# Patient Record
Sex: FEMALE | Race: BLACK OR AFRICAN AMERICAN | URBAN - METROPOLITAN AREA
[De-identification: names, ages, dates, MRNs, and addresses within clinical notes are randomized per-mention and may not be internally consistent; named-entity substitution may affect disease eponyms.]

---

## 2019-09-06 ENCOUNTER — OFFICE VISIT (OUTPATIENT)
Dept: URBAN - METROPOLITAN AREA CLINIC 17 | Facility: CLINIC | Age: 69
End: 2019-09-06
Payer: MEDICARE

## 2019-09-06 DIAGNOSIS — H35.371 PUCKERING OF MACULA, RIGHT EYE: ICD-10-CM

## 2019-09-06 DIAGNOSIS — H18.413 ARCUS SENILIS, BILATERAL: ICD-10-CM

## 2019-09-06 DIAGNOSIS — H11.153 PINGUECULA, BILATERAL: ICD-10-CM

## 2019-09-06 PROCEDURE — 92014 COMPRE OPH EXAM EST PT 1/>: CPT | Performed by: OPTOMETRIST

## 2019-09-06 PROCEDURE — 92133 CPTRZD OPH DX IMG PST SGM ON: CPT | Performed by: OPTOMETRIST

## 2019-09-06 ASSESSMENT — INTRAOCULAR PRESSURE
OS: 15
OD: 14

## 2019-09-06 NOTE — IMPRESSION/PLAN
Impression: Open angle with borderline findings, low risk, bilateral: H40.013. Plan: Discussed diagnosis in detail with patient. Obtained and reviewed OCT. No treatment is required at this time. Will continue to monitor IOP.  Repeat OCT in 2 years

## 2019-09-06 NOTE — IMPRESSION/PLAN
Impression: Other secondary cataract, bilateral: H26.493. Plan: Discussed diagnosis in detail with patient. No treatment is required at this time. Will continue to observe condition and or symptoms.  Refract PRN

## 2019-09-09 ENCOUNTER — OFFICE VISIT (OUTPATIENT)
Dept: URBAN - METROPOLITAN AREA CLINIC 17 | Facility: CLINIC | Age: 69
End: 2019-09-09
Payer: COMMERCIAL

## 2019-09-09 DIAGNOSIS — H52.4 PRESBYOPIA: Primary | ICD-10-CM

## 2019-09-09 PROCEDURE — 92012 INTRM OPH EXAM EST PATIENT: CPT | Performed by: OPTOMETRIST

## 2019-09-09 ASSESSMENT — KERATOMETRY
OD: 44.38
OS: 44.63

## 2019-09-09 ASSESSMENT — VISUAL ACUITY
OS: 20/25
OD: 20/25

## 2022-03-02 ENCOUNTER — OFFICE VISIT (OUTPATIENT)
Dept: URBAN - METROPOLITAN AREA CLINIC 17 | Facility: CLINIC | Age: 72
End: 2022-03-02
Payer: MEDICARE

## 2022-03-02 DIAGNOSIS — H26.493 OTHER SECONDARY CATARACT, BILATERAL: ICD-10-CM

## 2022-03-02 DIAGNOSIS — H43.813 VITREOUS DEGENERATION, BILATERAL: ICD-10-CM

## 2022-03-02 DIAGNOSIS — H35.373 PUCKERING OF MACULA, BILATERAL: ICD-10-CM

## 2022-03-02 DIAGNOSIS — E11.9 TYPE 2 DIABETES MELLITUS W/O COMPLICATION: Primary | ICD-10-CM

## 2022-03-02 DIAGNOSIS — H04.123 DRY EYE SYNDROME OF BILATERAL LACRIMAL GLANDS: ICD-10-CM

## 2022-03-02 DIAGNOSIS — H40.013 OPEN ANGLE WITH BORDERLINE FINDINGS, LOW RISK, BILATERAL: ICD-10-CM

## 2022-03-02 PROCEDURE — 92133 CPTRZD OPH DX IMG PST SGM ON: CPT | Performed by: OPTOMETRIST

## 2022-03-02 PROCEDURE — 92014 COMPRE OPH EXAM EST PT 1/>: CPT | Performed by: OPTOMETRIST

## 2022-03-02 ASSESSMENT — INTRAOCULAR PRESSURE
OS: 12
OD: 13

## 2022-03-02 NOTE — IMPRESSION/PLAN
Impression: Open angle with borderline findings, low risk, bilateral: H40.013. Plan: Discussed diagnosis in detail with patient. No treatment is required at this time. Will continue to observe condition and or symptoms. Patient instructed to call if condition gets worse.

## 2022-03-02 NOTE — IMPRESSION/PLAN
Impression: Type 2 diabetes mellitus w/o complication: D21.1. Plan: No diabetic retinopathy today. Discussed importance of closely monitoring and controlling glucose to minimize risks of progression of disease. Patient instructed to notify clinic immediately if changes to vision are noted.

## 2022-03-07 ENCOUNTER — OFFICE VISIT (OUTPATIENT)
Dept: URBAN - METROPOLITAN AREA CLINIC 17 | Facility: CLINIC | Age: 72
End: 2022-03-07
Payer: COMMERCIAL

## 2022-03-07 PROCEDURE — 92012 INTRM OPH EXAM EST PATIENT: CPT | Performed by: OPTOMETRIST

## 2022-03-07 ASSESSMENT — INTRAOCULAR PRESSURE
OD: 16
OS: 18

## 2022-03-07 ASSESSMENT — VISUAL ACUITY
OD: 20/20
OS: 20/20

## 2023-08-29 ENCOUNTER — OFFICE VISIT (OUTPATIENT)
Dept: URBAN - METROPOLITAN AREA CLINIC 17 | Facility: CLINIC | Age: 73
End: 2023-08-29
Payer: MEDICARE

## 2023-08-29 DIAGNOSIS — H43.813 VITREOUS DEGENERATION, BILATERAL: ICD-10-CM

## 2023-08-29 DIAGNOSIS — H35.371 PUCKERING OF MACULA, RIGHT EYE: ICD-10-CM

## 2023-08-29 DIAGNOSIS — H26.493 OTHER SECONDARY CATARACT, BILATERAL: ICD-10-CM

## 2023-08-29 DIAGNOSIS — E11.9 TYPE 2 DIABETES MELLITUS W/O COMPLICATION: Primary | ICD-10-CM

## 2023-08-29 DIAGNOSIS — Z96.1 PRESENCE OF INTRAOCULAR LENS: ICD-10-CM

## 2023-08-29 DIAGNOSIS — H04.123 DRY EYE SYNDROME OF BILATERAL LACRIMAL GLANDS: ICD-10-CM

## 2023-08-29 PROCEDURE — 92014 COMPRE OPH EXAM EST PT 1/>: CPT | Performed by: OPTOMETRIST

## 2023-08-29 ASSESSMENT — INTRAOCULAR PRESSURE
OD: 16
OS: 16

## 2024-09-24 ENCOUNTER — OFFICE VISIT (OUTPATIENT)
Dept: URBAN - METROPOLITAN AREA CLINIC 17 | Facility: CLINIC | Age: 74
End: 2024-09-24
Payer: MEDICARE

## 2024-09-24 DIAGNOSIS — H43.813 VITREOUS DEGENERATION, BILATERAL: ICD-10-CM

## 2024-09-24 DIAGNOSIS — E11.9 TYPE 2 DIABETES MELLITUS W/O COMPLICATION: Primary | ICD-10-CM

## 2024-09-24 DIAGNOSIS — H04.123 DRY EYE SYNDROME OF BILATERAL LACRIMAL GLANDS: ICD-10-CM

## 2024-09-24 DIAGNOSIS — H26.493 OTHER SECONDARY CATARACT, BILATERAL: ICD-10-CM

## 2024-09-24 PROCEDURE — 92014 COMPRE OPH EXAM EST PT 1/>: CPT | Performed by: OPTOMETRIST

## 2024-09-24 ASSESSMENT — INTRAOCULAR PRESSURE
OS: 16
OD: 16

## 2024-09-24 ASSESSMENT — VISUAL ACUITY: OD: 20/30

## 2024-10-01 ENCOUNTER — SURGERY (OUTPATIENT)
Dept: URBAN - METROPOLITAN AREA SURGERY 7 | Facility: SURGERY | Age: 74
End: 2024-10-01
Payer: MEDICARE

## 2024-10-01 PROCEDURE — 66821 AFTER CATARACT LASER SURGERY: CPT | Performed by: OPHTHALMOLOGY
